# Patient Record
Sex: FEMALE | Race: BLACK OR AFRICAN AMERICAN | NOT HISPANIC OR LATINO | URBAN - METROPOLITAN AREA
[De-identification: names, ages, dates, MRNs, and addresses within clinical notes are randomized per-mention and may not be internally consistent; named-entity substitution may affect disease eponyms.]

---

## 2018-06-11 ENCOUNTER — APPOINTMENT (RX ONLY)
Dept: URBAN - METROPOLITAN AREA CLINIC 86 | Facility: CLINIC | Age: 58
Setting detail: DERMATOLOGY
End: 2018-06-11

## 2018-06-11 DIAGNOSIS — L20.89 OTHER ATOPIC DERMATITIS: ICD-10-CM

## 2018-06-11 PROCEDURE — 99212 OFFICE O/P EST SF 10 MIN: CPT

## 2018-06-11 PROCEDURE — ? PRESCRIPTION

## 2018-06-11 PROCEDURE — ? COUNSELING

## 2018-06-11 RX ORDER — CRISABOROLE 20 MG/G
OINTMENT TOPICAL
Qty: 1 | Refills: 2 | Status: ERX | COMMUNITY
Start: 2018-06-11

## 2018-06-11 RX ADMIN — CRISABOROLE: 20 OINTMENT TOPICAL at 00:00

## 2018-06-11 ASSESSMENT — LOCATION SIMPLE DESCRIPTION DERM
LOCATION SIMPLE: RIGHT SUPERIOR EYELID
LOCATION SIMPLE: POSTERIOR NECK
LOCATION SIMPLE: LEFT SUPERIOR EYELID

## 2018-06-11 ASSESSMENT — LOCATION DETAILED DESCRIPTION DERM
LOCATION DETAILED: RIGHT POSTERIOR NECK
LOCATION DETAILED: RIGHT MEDIAL SUPERIOR EYELID
LOCATION DETAILED: LEFT LATERAL SUPERIOR EYELID

## 2018-06-11 ASSESSMENT — SEVERITY ASSESSMENT: SEVERITY: ALMOST CLEAR

## 2018-06-11 ASSESSMENT — BSA ECZEMA: % BODY COVERED IN ECZEMA: 2

## 2018-06-11 ASSESSMENT — LOCATION ZONE DERM
LOCATION ZONE: EYELID
LOCATION ZONE: NECK

## 2018-06-11 NOTE — HPI: RASH
Is This A New Presentation, Or A Follow-Up?: Follow Up Rash
Additional History: Patient reports hx or eyelid/periorbital dermatitis x years. Reports flares x 1-2 weeks. Patient reports applying previously received hydrocrotisone 1% topical to the eyelids qd x less than 1 week, which provides temporary relief her sxs. States being evaluated at an urgent care 1-2 weeks ago, prescribed 20mg oral prednisone sig 2 tabs po qd prn. The tabs temporarily resolved her sxs. Reports taking zyrtec for seasonal allergies. Denies using otc topicals to affected areas other than Mary lotion. Denies using eye drops.